# Patient Record
Sex: FEMALE | Race: WHITE | URBAN - METROPOLITAN AREA
[De-identification: names, ages, dates, MRNs, and addresses within clinical notes are randomized per-mention and may not be internally consistent; named-entity substitution may affect disease eponyms.]

---

## 2019-06-18 ENCOUNTER — APPOINTMENT (OUTPATIENT)
Dept: ULTRASOUND IMAGING | Age: 29
End: 2019-06-18
Attending: EMERGENCY MEDICINE
Payer: COMMERCIAL

## 2019-06-18 ENCOUNTER — APPOINTMENT (OUTPATIENT)
Dept: CT IMAGING | Age: 29
End: 2019-06-18
Attending: EMERGENCY MEDICINE
Payer: COMMERCIAL

## 2019-06-18 ENCOUNTER — HOSPITAL ENCOUNTER (EMERGENCY)
Age: 29
Discharge: HOME | End: 2019-06-18
Attending: EMERGENCY MEDICINE
Payer: COMMERCIAL

## 2019-06-18 VITALS
RESPIRATION RATE: 18 BRPM | HEART RATE: 80 BPM | DIASTOLIC BLOOD PRESSURE: 78 MMHG | WEIGHT: 160 LBS | SYSTOLIC BLOOD PRESSURE: 117 MMHG | TEMPERATURE: 98 F | BODY MASS INDEX: 25.71 KG/M2 | HEIGHT: 66 IN | OXYGEN SATURATION: 97 %

## 2019-06-18 DIAGNOSIS — S29.012A STRAIN OF THORACIC SPINE: Primary | ICD-10-CM

## 2019-06-18 DIAGNOSIS — M25.561 ARTHRALGIA OF RIGHT LOWER LEG: ICD-10-CM

## 2019-06-18 PROCEDURE — 93005 ELECTROCARDIOGRAM TRACING: CPT

## 2019-06-18 PROCEDURE — 81025 URINE PREGNANCY TEST: CPT

## 2019-06-18 PROCEDURE — 93010 ELECTROCARDIOGRAM REPORT: CPT

## 2019-06-18 PROCEDURE — 81001 URINALYSIS AUTO W/SCOPE: CPT | Performed by: EMERGENCY MEDICINE

## 2019-06-18 PROCEDURE — 84484 ASSAY OF TROPONIN QUANT: CPT | Performed by: EMERGENCY MEDICINE

## 2019-06-18 PROCEDURE — 71275 CT ANGIOGRAPHY CHEST: CPT | Performed by: EMERGENCY MEDICINE

## 2019-06-18 PROCEDURE — 80053 COMPREHEN METABOLIC PANEL: CPT | Performed by: EMERGENCY MEDICINE

## 2019-06-18 PROCEDURE — 99284 EMERGENCY DEPT VISIT MOD MDM: CPT

## 2019-06-18 PROCEDURE — 99285 EMERGENCY DEPT VISIT HI MDM: CPT

## 2019-06-18 PROCEDURE — 81015 MICROSCOPIC EXAM OF URINE: CPT | Performed by: EMERGENCY MEDICINE

## 2019-06-18 PROCEDURE — 85025 COMPLETE CBC W/AUTO DIFF WBC: CPT | Performed by: EMERGENCY MEDICINE

## 2019-06-18 PROCEDURE — 93971 EXTREMITY STUDY: CPT | Performed by: EMERGENCY MEDICINE

## 2019-06-18 PROCEDURE — 36415 COLL VENOUS BLD VENIPUNCTURE: CPT

## 2019-06-18 RX ORDER — OMEPRAZOLE 20 MG/1
20 CAPSULE, DELAYED RELEASE ORAL
COMMUNITY

## 2019-06-18 RX ORDER — CYCLOBENZAPRINE HCL 10 MG
10 TABLET ORAL 3 TIMES DAILY PRN
Qty: 20 TABLET | Refills: 0 | Status: SHIPPED | OUTPATIENT
Start: 2019-06-18

## 2019-06-18 NOTE — ED PROVIDER NOTES
Patient Seen in: THE [facility] In [place]    History   Patient presents with:  Back Pain (musculoskeletal)    Stated Complaint: pressure in the mid back, knot in back of right knee    HPI    This is a 31-year-old female who presents with air)       Current:/78   Pulse 80   Temp 98.2 °F (36.8 °C) (Oral)   Resp 18   Ht 167.6 cm (5' 6\")   Wt 72.6 kg   LMP 05/27/2019   SpO2 97%   BMI 25.82 kg/m²         Physical Exam  General: . Female no respiratory distress.   The patient is in no res orders were created for panel order CBC WITH DIFFERENTIAL WITH PLATELET.   Procedure                               Abnormality         Status                     ---------                               -----------         ------                     CBC W/ D subcentimeter lymph nodes are present within the mediastinum. None appear enlarged by CT criteria. ALONSO:  No mass or adenopathy. CARDIAC:  No enlargement, pericardial thickening, or significant calcification. PLEURA:  No mass or effusion.   CHEST WALL:  N within normal limits.   The patient's pain she has had some posterior thoracic pain even before all these events and says that it may be just her chronic pain but she does want to make sure she did have a blood clot the pain is reproducible when I press on

## 2019-06-18 NOTE — ED INITIAL ASSESSMENT (HPI)
C/o chronic back thoracic back \"pressure\" that has been worse since 5/28 when she traveled out of the country  Also c/o pressure behind right knee since her trip

## 2022-11-20 ENCOUNTER — HOSPITAL ENCOUNTER (EMERGENCY)
Age: 32
Discharge: HOME | End: 2022-11-20
Attending: EMERGENCY MEDICINE
Payer: COMMERCIAL

## 2022-11-20 ENCOUNTER — APPOINTMENT (OUTPATIENT)
Dept: GENERAL RADIOLOGY | Age: 32
End: 2022-11-20
Payer: COMMERCIAL

## 2022-11-20 VITALS
TEMPERATURE: 98 F | BODY MASS INDEX: 27.49 KG/M2 | OXYGEN SATURATION: 96 % | HEIGHT: 65 IN | DIASTOLIC BLOOD PRESSURE: 70 MMHG | WEIGHT: 165 LBS | SYSTOLIC BLOOD PRESSURE: 105 MMHG | HEART RATE: 101 BPM | RESPIRATION RATE: 18 BRPM

## 2022-11-20 DIAGNOSIS — S82.401A CLOSED FRACTURE OF SHAFT OF RIGHT FIBULA, UNSPECIFIED FRACTURE MORPHOLOGY, INITIAL ENCOUNTER: Primary | ICD-10-CM

## 2022-11-20 PROCEDURE — 73610 X-RAY EXAM OF ANKLE: CPT

## 2022-11-20 PROCEDURE — 29515 APPLICATION SHORT LEG SPLINT: CPT | Performed by: EMERGENCY MEDICINE

## 2022-11-20 PROCEDURE — 99284 EMERGENCY DEPT VISIT MOD MDM: CPT | Performed by: EMERGENCY MEDICINE

## 2022-11-20 NOTE — DISCHARGE INSTRUCTIONS
Please return to the ER/clinic if symptoms worsen. Follow-up with your PCP in 24-48 hours as needed. Wear the post mold as provided. Rest ice compression elevation. Call Ortho soon as possible for appointment for further evaluation and treatment i.e. permanent casting.

## (undated) NOTE — ED AVS SNAPSHOT
Leacandaceangie Yusuf   MRN: [id number]    Department:  THE [facility] in [place]   Date of Visit:  6/18/2019           Disclosure     Insurance plans vary and the physician(s) referred by the ER may not be covered by your plan.  Please cont tell this physician (or your personal doctor if your instructions are to return to your personal doctor) about any new or lasting problems. The primary care or specialist physician will see patients referred from the [facility] Emergency Department.  Elizabeth Faith

## (undated) NOTE — LETTER
Date & Time: 11/20/2022, 1:14 PM  Patient: Radha Pratt  Encounter Provider(s):    MD Jovany Butterfield [place]       To Whom It May Concern:    Millicent Sales was seen and treated in our department on 11/20/2022. Patient sustained a fracture to her right lower extremity.   Patient will return to work at the discretion of the orthopedist.    If you have any questions or concerns, please do not hesitate to call.        _____________________________  Physician/[facility] Signature